# Patient Record
Sex: FEMALE | Race: WHITE | Employment: UNEMPLOYED | URBAN - METROPOLITAN AREA
[De-identification: names, ages, dates, MRNs, and addresses within clinical notes are randomized per-mention and may not be internally consistent; named-entity substitution may affect disease eponyms.]

---

## 2022-10-27 ENCOUNTER — TRANSCRIBE ORDER (OUTPATIENT)
Dept: SCHEDULING | Age: 27
End: 2022-10-27

## 2022-10-27 ENCOUNTER — OFFICE VISIT (OUTPATIENT)
Dept: SURGERY | Age: 27
End: 2022-10-27
Payer: COMMERCIAL

## 2022-10-27 VITALS
SYSTOLIC BLOOD PRESSURE: 110 MMHG | OXYGEN SATURATION: 95 % | TEMPERATURE: 97.3 F | DIASTOLIC BLOOD PRESSURE: 70 MMHG | RESPIRATION RATE: 16 BRPM | HEART RATE: 71 BPM | BODY MASS INDEX: 27.92 KG/M2 | WEIGHT: 167.6 LBS | HEIGHT: 65 IN

## 2022-10-27 DIAGNOSIS — R10.12 LUQ PAIN: Primary | ICD-10-CM

## 2022-10-27 PROCEDURE — 99204 OFFICE O/P NEW MOD 45 MIN: CPT | Performed by: SURGERY

## 2022-10-27 NOTE — Clinical Note
10/27/2022    Patient: Layne Nguyen   YOB: 1995   Date of Visit: 10/27/2022     [place] [id number]  Via Fax: [phone]    Dear Moise Wild MD,      Thank you for referring Ms. Coni Carreon to  [facility] for evaluation. My notes for this consultation are attached. If you have questions, please do not hesitate to call me. I look forward to following your patient along with you.       Sincerely,    Maria L Gan MD

## 2022-10-27 NOTE — PROGRESS NOTES
HISTORY OF PRESENT ILLNESS  Adry Starr is a 28 y.o. female. Left abdominal pain started about 2 years ago. Had a CT scan of the abdomen and pelvis in June 2021. This was done at [facility]. I do not have access to the images. Reports having an US, not sure where    left side abdominal pain  Worse with reaching, crunches      Pain not changed  No nausea  BMs ok  Appetite ok        ____________________________________________________________________________  Patient presents with:  Possible Hernia: Seen at the request of LISETTE Angel MD for the evaluation of a possible hernia. /70 (BP 1 Location: Left upper arm, BP Patient Position: Sitting)   Pulse 71   Temp 97.3 °F (36.3 °C) (Temporal)   Resp 16   Ht 5' 5\" (1.651 m)   Wt 76 kg (167 lb 9.6 oz)   SpO2 95%   BMI 27.89 kg/m²   History reviewed. No pertinent past medical history. Past Surgical History:  No date: HX WISDOM TEETH EXTRACTION  Social History    Socioeconomic History      Marital status: SINGLE    Tobacco Use      Smoking status: Never      Smokeless tobacco: Never    Vaping Use      Vaping Use: Never used    Substance and Sexual Activity      Alcohol use: Yes        Comment: socially      Drug use: Never    Review of patient's family history indicates:  Problem: Stroke      Relation: Mother          Age of Onset: (Not Specified)  Problem: Stroke      Relation: Father          Age of Onset: (Not Specified)    Current Outpatient Medications:  norethindrone-ethinyl estradiol ([address] 1-35 TAB, NORTREL 1-35 TAB) 1-35 mg-mcg tab, Take  by mouth. No current facility-administered medications for this visit. Allergies: No Known Allergies  _____________________________________________________________________________        Possible Hernia  The history is provided by the Patient. This is a chronic problem. The current episode started more than 1 week ago. The problem occurs constantly.  The problem has not changed since onset. Associated symptoms include abdominal pain. Pertinent negatives include no chest pain, no headaches and no shortness of breath. The symptoms are aggravated by exertion, bending and twisting. The symptoms are relieved by rest. The treatment provided no relief. Review of Systems   Constitutional:  Negative for chills, fever and weight loss. HENT:  Negative for ear pain. Eyes:  Negative for pain. Respiratory:  Negative for shortness of breath. Cardiovascular:  Negative for chest pain. Gastrointestinal:  Positive for abdominal pain. Negative for blood in stool. Genitourinary:  Negative for hematuria. Musculoskeletal:  Negative for joint pain. Skin:  Negative for rash. Neurological:  Negative for dizziness, focal weakness, seizures and headaches. Endo/Heme/Allergies:  Does not bruise/bleed easily. Psychiatric/Behavioral:  The patient does not have insomnia. Physical Exam  Constitutional:       General: She is not in acute distress. Appearance: She is well-developed. She is not diaphoretic. HENT:      Head: Normocephalic and atraumatic. Mouth/Throat:      Pharynx: No oropharyngeal exudate. Eyes:      Pupils: Pupils are equal, round, and reactive to light. Neck:      Trachea: No tracheal deviation. Cardiovascular:      Rate and Rhythm: Normal rate and regular rhythm. Heart sounds: Normal heart sounds. No murmur heard. Pulmonary:      Effort: Pulmonary effort is normal. No respiratory distress. Breath sounds: Normal breath sounds. No wheezing. Abdominal:      General: Bowel sounds are normal. There is no distension. Palpations: Abdomen is soft. There is no mass. Tenderness: There is abdominal tenderness in the left upper quadrant. There is no guarding or rebound. Musculoskeletal:         General: No tenderness. Normal range of motion. Cervical back: Normal range of motion. Lymphadenopathy:      Cervical: No cervical adenopathy. Skin:     General: Skin is warm. Findings: No erythema or rash. Neurological:      Mental Status: She is alert and oriented to person, place, and time. Psychiatric:         Behavior: Behavior normal.       ASSESSMENT and PLAN    ICD-10-CM ICD-9-CM    1. LUQ pain  R10.12 789.02 US [facility]              She has should been having pain for 2 years now and unfortunately the diagnosis has been elusive. Today her pain seems to localize to about the tip of the 10th left rib. We discussed a differential diagnosis. While it is unusual to get a hernia in this location. It certainly is possible she may have occult hernia through the deep oblique layer. The pain may be musculoskeletal such as slipped rib syndrome versus a trigger point. I suggested we do a dynamic ultrasound over the area to try to help make the diagnosis. I also have requested the CT scan images for me to review. Further management after review of the CT and ultrasound. She expressed understanding of our discussion and is agreeable with the plan. Thank you for this consult.

## 2022-10-27 NOTE — PROGRESS NOTES
Identified pt with two pt identifiers(name and ). Reviewed record in preparation for visit and have obtained necessary documentation. All patient medications has been reviewed. Chief Complaint   Patient presents with    Possible Hernia     Seen at the request of LISETTE Bill MD for the evaluation of a possible hernia. Health Maintenance Due   Topic    Hepatitis C Screening     Depression Screen     COVID-19 Vaccine (1)    DTaP/Tdap/Td series (1 - Tdap)    Pap Smear     Flu Vaccine (1)       Vitals:    10/27/22 1422   BP: 110/70   Pulse: 71   Resp: 16   Temp: 97.3 °F (36.3 °C)   TempSrc: Temporal   SpO2: 95%   Weight: 167 lb 9.6 oz (76 kg)   Height: 5' 5\" (1.651 m)   PainSc:   5   PainLoc: Abdomen       4. Have you been to the ER, urgent care clinic since your last visit? Hospitalized since your last visit? No    5. Have you seen or consulted any other health care providers outside of the [address] [place] [address] since your last visit? Include any pap smears or colon screening. No      Patient is accompanied by self I have received verbal consent from Baltazar Woodson to discuss any/all medical information while they are present in the room.

## 2022-10-28 ENCOUNTER — TELEPHONE (OUTPATIENT)
Dept: SURGERY | Age: 27
End: 2022-10-28

## 2022-10-28 NOTE — TELEPHONE ENCOUNTER
Placed a call to pt to advise her of date, time and location of ultrasound ordered by Dr. Charanjit Rangel. Left  for return call.

## 2022-11-07 ENCOUNTER — HOSPITAL ENCOUNTER (OUTPATIENT)
Dept: ULTRASOUND IMAGING | Age: 27
Discharge: HOME | End: 2022-11-07
Attending: SURGERY
Payer: COMMERCIAL

## 2022-11-07 DIAGNOSIS — R10.12 LUQ PAIN: ICD-10-CM

## 2022-11-07 PROCEDURE — 76705 ECHO EXAM OF ABDOMEN: CPT

## 2022-11-30 ENCOUNTER — TELEPHONE (OUTPATIENT)
Dept: SURGERY | Age: 27
End: 2022-11-30

## 2022-11-30 NOTE — TELEPHONE ENCOUNTER
Call out to [facility]ER[facility] again to request fax number so that I can send a request for CD of Abd. And pelvis CT to be sent to us. Had to leave another voicemail.

## 2023-01-04 ENCOUNTER — TELEPHONE (OUTPATIENT)
Dept: SURGERY | Age: 28
End: 2023-01-04

## 2023-01-04 NOTE — TELEPHONE ENCOUNTER
Call out to [facility] again to try and get her imaging sent to us on a CD. Had to leave another voice mail. Ms Khoi Roldan has been made aware. She is going to try to get a copy of the imaging on a CD and bring it by the office. I made her aware that is what Dr Harjinder De is waiting on.

## 2023-01-16 ENCOUNTER — TELEPHONE (OUTPATIENT)
Dept: SURGERY | Age: 28
End: 2023-01-16

## 2023-01-16 NOTE — TELEPHONE ENCOUNTER
We finally got the CT images from [facility] Lender [place].  Reviewed images. No hernia no masses. If symptoms remain could be a trigger point or slipped rib syndrome. Tried calling to discuss. No answer and mailbox is full.

## 2023-01-20 NOTE — TELEPHONE ENCOUNTER
I was able to reach Ms Zacarias Manzanares to let her know per Dr Sharon Deluca: We finally got the CT images from [facility].  Reviewed images. No hernia no masses. If symptoms remain could be a trigger point or slipped rib syndrome.

## 2023-02-21 ENCOUNTER — HOSPITAL ENCOUNTER (OUTPATIENT)
Dept: GENERAL RADIOLOGY | Age: 28
Discharge: HOME | End: 2023-02-21
Payer: COMMERCIAL

## 2023-02-21 ENCOUNTER — TRANSCRIBE ORDER (OUTPATIENT)
Dept: REGISTRATION | Age: 28
End: 2023-02-21

## 2023-02-21 DIAGNOSIS — M54.2 CERVICALGIA: Primary | ICD-10-CM

## 2023-02-21 DIAGNOSIS — M54.2 CERVICALGIA: ICD-10-CM

## 2023-02-21 PROCEDURE — 72052 X-RAY EXAM NECK SPINE 6/>VWS: CPT
